# Patient Record
Sex: FEMALE | Race: BLACK OR AFRICAN AMERICAN
[De-identification: names, ages, dates, MRNs, and addresses within clinical notes are randomized per-mention and may not be internally consistent; named-entity substitution may affect disease eponyms.]

---

## 2017-10-06 ENCOUNTER — HOSPITAL ENCOUNTER (OUTPATIENT)
Dept: HOSPITAL 61 - KCIC MRI | Age: 77
Discharge: HOME | End: 2017-10-06
Attending: ORTHOPAEDIC SURGERY
Payer: COMMERCIAL

## 2017-10-06 DIAGNOSIS — M48.061: ICD-10-CM

## 2017-10-06 DIAGNOSIS — M51.16: Primary | ICD-10-CM

## 2017-10-06 DIAGNOSIS — M41.86: ICD-10-CM

## 2017-10-06 PROCEDURE — 72148 MRI LUMBAR SPINE W/O DYE: CPT

## 2017-10-06 NOTE — [facility]
EXAMINATION: Magnetic resonance imaging (MRI) of the lumbar spine without 

contrast

 

HISTORY: Left lumbar radiculopathy.

 

TECHNIQUE: Multiplanar multi-weighted MRI of the lumbar spine was 

performed without intravenous contrast using the standard lumbar spine 

protocol.

 

Contrast information:

None administered.

 

COMPARISON: None available.

 

FINDINGS:

 

There is levoconvex scoliosis of the lumbar spine with apex levocurvature 

at L2-L3. There is low T1 and T2 signal intensity involving the marrow 

with patchy distribution throughout the visualized spine and pelvis. There

is minimal retrolisthesis of L2 on L3. There are no compression fractures.

The conus medullaris terminates at the level of L1. The distal spinal cord

signal intensity is normal.  There is disc desiccation at all levels of 

the lumbar spine sparing L5-S1. Limited views of the abdomen and pelvis 

show no soft tissue abnormality.  The aorta is normal. 

 

T12-L1: There is minimal disc bulge. Mild facet arthropathy. There is no 

neural foraminal or spinal canal stenosis.

 

L1-L2: There is a diffuse disc bulge. There is mild facet arthropathy. 

There is moderate right neuroforaminal stenosis. There is mild spinal 

canal stenosis.

 

L2-L3: There is diffuse disc bulge. There is moderate facet arthropathy. 

There is severe right and moderate left neuroforaminal stenosis. There is 

mild spinal canal stenosis.

 

L3-L4: There is diffuse disc bulge . There is moderate facet arthropathy 

with ligamentum flavum infolding. There is moderate neuroforaminal 

stenosis. There is moderate spinal canal stenosis.

 

L4-L5: There is diffuse disc bulge with left central disc extrusion 

extending into the left foraminal zone. There is moderate to severe facet 

arthropathy with ligamentum flavum infolding. There is severe left and 

moderate right neuroforaminal stenosis. There is mild to moderate spinal 

canal stenosis.

 

L5-S1: Minimal disc bulge. There is moderate to severe facet arthropathy. 

There is mild left neuroforaminal stenosis. There is no spinal canal 

stenosis.

 

IMPRESSION:

 

1. Heterogeneous marrow signal intensity with patchy areas of low T1 and 

T2 signal suspicious for a marrow replacing process such as metastasis 

versus multiple myeloma. No pathologic fracture is identified.

 

2. Levoconvex scoliosis with moderate degenerative changes of the lumbar 

spine, most prominent at L4-L5 with disc bulge and left central disc 

extrusion with moderate to severe facet arthropathy and ligamentum flavum 

infolding resulting in severe left neuroforaminal stenosis and 

mild-to-moderate spinal canal stenosis.

 

3. Disc bulge with moderate facet arthropathy and ligamentum flavum 

infolding resulting in moderate neuroforaminal stenosis and moderate 

spinal canal stenosis at L3-L4.

 

Critical results were discussed with Naomy at Dr. Tellez's office on 

10/6/2017 at 3:30 PM by Dr. Jo.

 

Electronically signed by: Faith Jo MD (10/6/2017 3:36 PM) 

[facility]

## 2019-06-20 ENCOUNTER — HOSPITAL ENCOUNTER (OUTPATIENT)
Dept: HOSPITAL 61 - KCIC US | Age: 79
Discharge: HOME | End: 2019-06-20
Attending: FAMILY MEDICINE
Payer: MEDICARE

## 2019-06-20 DIAGNOSIS — R10.84: Primary | ICD-10-CM

## 2019-06-20 PROCEDURE — 76700 US EXAM ABDOM COMPLETE: CPT

## 2019-06-20 NOTE — [facility]
Limited abdominal ultrasound 6/20/2019

 

INDICATION: Generalized abdominal pain

 

COMPARISON STUDY: CT of the abdomen and pelvis October 19, 2011.

 

Discussion: Per technologist exam limited by patient body habitus. 

Ultrasound evaluation of the abdomen was performed. Static images are 

submitted to PACS. The pancreas is poorly visualized. The liver is 

diffusely hyperechoic. The liver is normal in size measuring 16 cm 

longitudinally. Portions of the liver are obscured. No intrahepatic 

biliary dilatation is seen. The common bile duct is normal diameter 

measuring 2-3 mm. The gallbladder demonstrates no evidence of wall 

thickening, stones, or sludge. No pericholecystic fluid is seen. The right

kidney is normal in appearance measuring 10.2 cm longitudinally. 

Visualized portions of the aorta and IVC are grossly unremarkable. Left 

kidney is normal in appearance measuring 10.4 cm longitudinally. The 

spleen is poorly visualized.

 

IMPRESSION:

1. No sonographic evidence of acute intra-abdominal abnormality is 

identified

2. Possible hepatic steatosis

3. Poorly visualized spleen and pancreas.

 

Electronically signed by: Alex Hernández MD (6/20/2019 12:52 PM) [id number]

## 2021-06-14 ENCOUNTER — HOSPITAL ENCOUNTER (OUTPATIENT)
Dept: HOSPITAL 61 - ONCLAB | Age: 81
End: 2021-06-14
Attending: INTERNAL MEDICINE
Payer: MEDICARE

## 2021-06-14 DIAGNOSIS — D50.0: Primary | ICD-10-CM

## 2021-06-14 PROCEDURE — 85045 AUTOMATED RETICULOCYTE COUNT: CPT

## 2021-06-14 PROCEDURE — 83520 IMMUNOASSAY QUANT NOS NONAB: CPT

## 2021-06-14 PROCEDURE — 36415 COLL VENOUS BLD VENIPUNCTURE: CPT

## 2021-06-14 PROCEDURE — 84165 PROTEIN E-PHORESIS SERUM: CPT

## 2021-06-14 PROCEDURE — 82525 ASSAY OF COPPER: CPT

## 2021-06-14 PROCEDURE — 82784 ASSAY IGA/IGD/IGG/IGM EACH: CPT

## 2021-06-14 PROCEDURE — 82746 ASSAY OF FOLIC ACID SERUM: CPT

## 2021-06-14 PROCEDURE — 82607 VITAMIN B-12: CPT

## 2021-06-14 PROCEDURE — 86334 IMMUNOFIX E-PHORESIS SERUM: CPT

## 2021-06-15 LAB
ALBUM: 3 G/DL (ref 2.9–4.4)
ALPHA1 GLOB SERPL ELPH-MCNC: 0.4 G/DL (ref 0–0.4)
ALPHA2 GLOB SERPL ELPH-MCNC: 0.9 G/DL (ref 0.4–1)
B-GLOBULIN SERPL ELPH-MCNC: 1.4 G/DL (ref 0.7–1.3)
GAMMA GLOB SERPL ELPH-MCNC: 1.8 G/DL (ref 0.4–1.8)
IGA SERPL-MCNC: 597 MG/DL (ref 64–422)
IGG SERPL-MCNC: 1735 MG/DL (ref 586–1602)
IGM SERPL-MCNC: 102 MG/DL (ref 26–217)
KAPPA LC SERPL-MCNC: 51.9 MG/L (ref 3.3–19.4)
KAPPA LC/LAMBDA SER: 1.11 {RATIO} (ref 0.26–1.65)
LAMBDA LC FREE SERPL-MCNC: 46.6 MG/L (ref 5.7–26.3)
PROT SERPL-MCNC: 7.6 G/DL (ref 6–8.5)
SPEP AG RATIO: 0.7 (ref 0.7–1.7)

## 2021-06-28 ENCOUNTER — HOSPITAL ENCOUNTER (OUTPATIENT)
Dept: HOSPITAL 61 - ONCLAB | Age: 81
End: 2021-06-28
Attending: PHYSICIAN ASSISTANT
Payer: MEDICARE

## 2021-06-28 DIAGNOSIS — D50.0: Primary | ICD-10-CM

## 2021-06-28 LAB
ANION GAP SERPL CALC-SCNC: 10 MMOL/L (ref 6–14)
BASOPHILS # BLD AUTO: 0 X10^3/UL (ref 0–0.2)
BASOPHILS NFR BLD: 0 % (ref 0–3)
BUN SERPL-MCNC: 7 MG/DL (ref 7–20)
CALCIUM SERPL-MCNC: 8.7 MG/DL (ref 8.5–10.1)
CHLORIDE SERPL-SCNC: 108 MMOL/L (ref 98–107)
CO2 SERPL-SCNC: 28 MMOL/L (ref 21–32)
CREAT SERPL-MCNC: 0.6 MG/DL (ref 0.6–1)
EOSINOPHIL NFR BLD: 0 X10^3/UL (ref 0–0.7)
EOSINOPHIL NFR BLD: 1 % (ref 0–3)
ERYTHROCYTE [DISTWIDTH] IN BLOOD BY AUTOMATED COUNT: 19 % (ref 11.5–14.5)
GFR SERPLBLD BASED ON 1.73 SQ M-ARVRAT: 117 ML/MIN
GLUCOSE SERPL-MCNC: 95 MG/DL (ref 70–99)
HCT VFR BLD CALC: 26.3 % (ref 36–47)
HGB BLD-MCNC: 7.9 G/DL (ref 12–15.5)
LYMPHOCYTES # BLD: 0.8 X10^3/UL (ref 1–4.8)
LYMPHOCYTES NFR BLD AUTO: 15 % (ref 24–48)
MCH RBC QN AUTO: 25 PG (ref 25–35)
MCHC RBC AUTO-ENTMCNC: 30 G/DL (ref 31–37)
MCV RBC AUTO: 82 FL (ref 79–100)
MONO #: 1 X10^3/UL (ref 0–1.1)
MONOCYTES NFR BLD: 20 % (ref 0–9)
NEUT #: 3.4 X10^3/UL (ref 1.8–7.7)
NEUTROPHILS NFR BLD AUTO: 64 % (ref 31–73)
PLATELET # BLD AUTO: 526 X10^3/UL (ref 140–400)
POTASSIUM SERPL-SCNC: 3.2 MMOL/L (ref 3.5–5.1)
RBC # BLD AUTO: 3.21 X10^6/UL (ref 3.5–5.4)
SODIUM SERPL-SCNC: 146 MMOL/L (ref 136–145)
WBC # BLD AUTO: 5.3 X10^3/UL (ref 4–11)

## 2021-06-28 PROCEDURE — 85025 COMPLETE CBC W/AUTO DIFF WBC: CPT

## 2021-06-28 PROCEDURE — 36415 COLL VENOUS BLD VENIPUNCTURE: CPT

## 2021-06-28 PROCEDURE — 80048 BASIC METABOLIC PNL TOTAL CA: CPT

## 2021-07-01 ENCOUNTER — HOSPITAL ENCOUNTER (OUTPATIENT)
Dept: HOSPITAL 61 - ECHO | Age: 81
End: 2021-07-01
Attending: INTERNAL MEDICINE
Payer: MEDICARE

## 2021-07-01 DIAGNOSIS — I51.7: Primary | ICD-10-CM

## 2021-07-01 PROCEDURE — 93306 TTE W/DOPPLER COMPLETE: CPT

## 2021-07-01 NOTE — CARD
MR#: [id number]

Account#: [id number]

Accession#: [id number]

Date of Study: 07/01/2021

Ordering Physician: RIGO SORTO, 

Referring Physician: RIGO SORTO, 

Tech: Lorena Vazquez, [facility]





--------------- APPROVED REPORT --------------





EXAM: Two-dimensional and M-mode echocardiogram with Doppler and color Doppler.



Other Information 

Quality : AverageHR: 91bpm



INDICATION

Dyspnea 



RISK FACTORS

Hypertension 

Hyperlipidemia



2D DIMENSIONS 

RVDd3.2 (2.9-3.5cm)Left Atrium(2D)3.1 (1.6-4.0cm)

IVSd1.2 (0.7-1.1cm)Aortic Root(2D)2.9 (2.0-3.7cm)

LVDd4.7 (3.9-5.9cm)LVOT Diameter2.0 (1.8-2.4cm)

PWd1.3 (0.7-1.1cm)LVDs2.8 (2.5-4.0cm)

FS (%) 39.9 %SV70.5 ml

LVEF(%)60.6 (>50%)



Aortic Valve

AoV Peak Presley.146.7cm/sAoV VTI22.3cm

AO Peak GR.8.6mmHgLVOT Peak Presley.99.7cm/s

LVOT  VTI 16.35cmAO Mean GR.4mmHg

SUSAN (VMAX)1.60hi5XMF   (VTI)2.35cm2



Mitral Valve

MV E Vjkmejzx81.5cm/sMV DECEL [id number]

MV A Dclypsza65.4cm/sMV AUN02tm

E/A  Ratio0.8MVA (PHT)2.80cm2



TDI

E/Lateral E'6.8E/Medial E'7.8



Pulmonary Valve

PV Peak Sahxujco15.2cm/sPV Peak Grad.3mmHg



Tricuspid Valve

TR P. Zjmmharc284fz/sRAP ZYGHYDXA9xwBi

TR Peak Gr.96jbStNLOQ68djMm



Pulmonary Vein

S1 Bhvngkms61.1cm/sD2 Pwmgxdzo32.5cm/s

PVa [id number]



 LEFT VENTRICLE 

The left ventricle is normal size. There is mild to moderate concentric left ventricular hypertrophy.
 The left ventricular systolic function is normal. The Ejection Fraction is 50-55%. There is normal L
V segmental wall motion. Transmitral Doppler flow pattern is Grade I-abnormal relaxation pattern.



 RIGHT VENTRICLE 

The right ventricle is normal size. There is normal right ventricular wall thickness. The right ventr
icular systolic function is normal.



 ATRIA 

The left atrium size is normal. The right atrium size is normal. The interatrial septum is intact wit
h no evidence for an atrial septal defect or patent foramen ovale as noted on 2-D or Doppler imaging.




 AORTIC VALVE 

The aortic valve is normal in structure and function. Doppler and Color Flow revealed trace aortic re
gurgitation. There is no significant aortic valvular stenosis. Calculated aortic valve area is 1.95 c
m2 with maximum pressure gradient of 10 mmHg and mean pressure gradient of 5 mmHg.



 MITRAL VALVE 

The mitral valve is normal in structure and function. There is no evidence of mitral valve prolapse. 
There is no mitral valve stenosis. Doppler and Color-flow revealed trace mitral regurgitation.



 TRICUSPID VALVE 

The tricuspid valve is normal in structure and function. Doppler and Color Flow revealed trace tricus
pid regurgitation with an estimated PAP of 31 mmHg. There is no tricuspid valve stenosis.



 PULMONIC VALVE 

The pulmonary valve is normal in structure and function. Doppler and Color Flow revealed trace pulmon
ic valvular regurgitation.



 GREAT VESSELS 

The aortic root is normal in size. The ascending aorta is normal in size. The IVC is normal in size a
nd collapses >50% with inspiration.



 PERICARDIAL EFFUSION 

There is no evidence of significant pericardial effusion.



Critical Notification

Critical Value: No



<Conclusion>

The left ventricular systolic function is normal.

The Ejection Fraction is 50-55%.

There is normal LV segmental wall motion.

Transmitral Doppler flow pattern is Grade I-abnormal relaxation pattern.

Trace mitral regurgitation.

Trace tricuspid regurgitation with an estimated PAP of 31 mmHg.

There is no evidence of significant pericardial effusion.



Signed by : Juan David Sutton, 

Electronically Approved : 07/01/2021 12:51:09

## 2021-07-12 ENCOUNTER — HOSPITAL ENCOUNTER (OUTPATIENT)
Dept: HOSPITAL 61 - ONCLAB | Age: 81
End: 2021-07-12
Attending: INTERNAL MEDICINE
Payer: MEDICARE

## 2021-07-12 DIAGNOSIS — D50.0: Primary | ICD-10-CM

## 2021-07-12 LAB
ANISOCYTOSIS BLD QL SMEAR: (no result)
BASOPHILS # BLD AUTO: 0 X10^3/UL (ref 0–0.2)
BASOPHILS NFR BLD: 1 % (ref 0–3)
DACRYOCYTES BLD QL SMEAR: (no result)
EOSINOPHIL NFR BLD: 0 X10^3/UL (ref 0–0.7)
EOSINOPHIL NFR BLD: 1 % (ref 0–3)
ERYTHROCYTE [DISTWIDTH] IN BLOOD BY AUTOMATED COUNT: 25.7 % (ref 11.5–14.5)
HCT VFR BLD CALC: 29.3 % (ref 36–47)
HGB BLD-MCNC: 9 G/DL (ref 12–15.5)
LYMPHOCYTES # BLD: 1.6 X10^3/UL (ref 1–4.8)
LYMPHOCYTES NFR BLD AUTO: 33 % (ref 24–48)
MCH RBC QN AUTO: 26 PG (ref 25–35)
MCHC RBC AUTO-ENTMCNC: 31 G/DL (ref 31–37)
MCV RBC AUTO: 84 FL (ref 79–100)
MONO #: 0.7 X10^3/UL (ref 0–1.1)
MONOCYTES NFR BLD: 14 % (ref 0–9)
NEUT #: 2.5 X10^3/UL (ref 1.8–7.7)
NEUTROPHILS NFR BLD AUTO: 51 % (ref 31–73)
OVALOCYTES BLD QL SMEAR: (no result)
PLATELET # BLD AUTO: 458 X10^3/UL (ref 140–400)
PLATELET # BLD EST: (no result) 10*3/UL
POLYCHROMASIA BLD QL SMEAR: PRESENT
RBC # BLD AUTO: 3.5 X10^6/UL (ref 3.5–5.4)
TARGETS BLD QL SMEAR: PRESENT
WBC # BLD AUTO: 4.9 X10^3/UL (ref 4–11)

## 2021-07-12 PROCEDURE — 83540 ASSAY OF IRON: CPT

## 2021-07-12 PROCEDURE — 85025 COMPLETE CBC W/AUTO DIFF WBC: CPT

## 2021-07-12 PROCEDURE — 36415 COLL VENOUS BLD VENIPUNCTURE: CPT

## 2021-07-12 PROCEDURE — 83550 IRON BINDING TEST: CPT

## 2021-07-12 PROCEDURE — 82728 ASSAY OF FERRITIN: CPT

## 2021-07-15 ENCOUNTER — HOSPITAL ENCOUNTER (OUTPATIENT)
Dept: HOSPITAL 61 - ONCLAB | Age: 81
End: 2021-07-15
Attending: PHYSICIAN ASSISTANT
Payer: MEDICARE

## 2021-07-15 DIAGNOSIS — D50.0: Primary | ICD-10-CM

## 2021-07-15 LAB
FECAL OB PT: NEGATIVE
FECAL OB PT: NEGATIVE

## 2021-07-15 PROCEDURE — 82274 ASSAY TEST FOR BLOOD FECAL: CPT

## 2021-08-16 ENCOUNTER — HOSPITAL ENCOUNTER (OUTPATIENT)
Dept: HOSPITAL 61 - ONCLAB | Age: 81
End: 2021-08-16
Attending: PHYSICIAN ASSISTANT
Payer: MEDICARE

## 2021-08-16 DIAGNOSIS — D50.0: Primary | ICD-10-CM

## 2021-08-16 DIAGNOSIS — E03.9: ICD-10-CM

## 2021-08-16 LAB
% ATYL: 3 % (ref 0–0)
% BANDS: 1 % (ref 0–9)
% LYMPHS: 19 % (ref 24–48)
% MONOS: 12 % (ref 0–10)
% SEGS: 65 % (ref 35–66)
ANISOCYTOSIS BLD QL SMEAR: PRESENT
BASOPHILS # BLD AUTO: 0 X10^3/UL (ref 0–0.2)
BASOPHILS NFR BLD: 1 % (ref 0–3)
EOSINOPHIL NFR BLD: 0 X10^3/UL (ref 0–0.7)
EOSINOPHIL NFR BLD: 1 % (ref 0–3)
ERYTHROCYTE [DISTWIDTH] IN BLOOD BY AUTOMATED COUNT: 22.3 % (ref 11.5–14.5)
HCT VFR BLD CALC: 27.8 % (ref 36–47)
HGB BLD-MCNC: 8.6 G/DL (ref 12–15.5)
HYPOCHROMIA BLD QL SMEAR: PRESENT
LYMPHOCYTES # BLD: 1.8 X10^3/UL (ref 1–4.8)
LYMPHOCYTES NFR BLD AUTO: 37 % (ref 24–48)
MCH RBC QN AUTO: 25 PG (ref 25–35)
MCHC RBC AUTO-ENTMCNC: 31 G/DL (ref 31–37)
MCV RBC AUTO: 81 FL (ref 79–100)
MICROCYTES BLD QL SMEAR: PRESENT
MONO #: 0.7 X10^3/UL (ref 0–1.1)
MONOCYTES NFR BLD: 15 % (ref 0–9)
NEUT #: 2.3 X10^3/UL (ref 1.8–7.7)
NEUTROPHILS NFR BLD AUTO: 47 % (ref 31–73)
PLATELET # BLD AUTO: 501 X10^3/UL (ref 140–400)
PLATELET # BLD EST: (no result) 10*3/UL
POLYCHROMASIA BLD QL SMEAR: PRESENT
RBC # BLD AUTO: 3.43 X10^6/UL (ref 3.5–5.4)
TARGETS BLD QL SMEAR: PRESENT
WBC # BLD AUTO: 4.9 X10^3/UL (ref 4–11)

## 2021-08-16 PROCEDURE — 83550 IRON BINDING TEST: CPT

## 2021-08-16 PROCEDURE — 36415 COLL VENOUS BLD VENIPUNCTURE: CPT

## 2021-08-16 PROCEDURE — 82728 ASSAY OF FERRITIN: CPT

## 2021-08-16 PROCEDURE — 85007 BL SMEAR W/DIFF WBC COUNT: CPT

## 2021-08-16 PROCEDURE — 83540 ASSAY OF IRON: CPT

## 2021-08-16 PROCEDURE — 85025 COMPLETE CBC W/AUTO DIFF WBC: CPT

## 2021-08-16 PROCEDURE — 84443 ASSAY THYROID STIM HORMONE: CPT

## 2021-08-26 ENCOUNTER — HOSPITAL ENCOUNTER (OUTPATIENT)
Dept: HOSPITAL 61 - ONCLAB | Age: 81
End: 2021-08-26
Attending: PHYSICIAN ASSISTANT
Payer: MEDICARE

## 2021-08-26 DIAGNOSIS — D50.0: Primary | ICD-10-CM

## 2021-08-26 LAB
% ATYL: 1 % (ref 0–0)
% BANDS: 2 % (ref 0–9)
% LYMPHS: 21 % (ref 24–48)
% MONOS: 14 % (ref 0–10)
% SEGS: 58 % (ref 35–66)
ANISOCYTOSIS BLD QL SMEAR: (no result)
BASOPHILS # BLD AUTO: 0.1 X10^3/UL (ref 0–0.2)
BASOPHILS NFR BLD AUTO: 3 % (ref 0–3)
BASOPHILS NFR BLD: 1 % (ref 0–3)
EOSINOPHIL NFR BLD AUTO: 1 % (ref 0–5)
EOSINOPHIL NFR BLD: 0.1 X10^3/UL (ref 0–0.7)
EOSINOPHIL NFR BLD: 1 % (ref 0–3)
ERYTHROCYTE [DISTWIDTH] IN BLOOD BY AUTOMATED COUNT: 21.8 % (ref 11.5–14.5)
HCT VFR BLD CALC: 29.3 % (ref 36–47)
HGB BLD-MCNC: 8.9 G/DL (ref 12–15.5)
LYMPHOCYTES # BLD: 1.5 X10^3/UL (ref 1–4.8)
LYMPHOCYTES NFR BLD AUTO: 30 % (ref 24–48)
MCH RBC QN AUTO: 25 PG (ref 25–35)
MCHC RBC AUTO-ENTMCNC: 30 G/DL (ref 31–37)
MCV RBC AUTO: 82 FL (ref 79–100)
MONO #: 0.8 X10^3/UL (ref 0–1.1)
MONOCYTES NFR BLD: 17 % (ref 0–9)
NEUT #: 2.4 X10^3/UL (ref 1.8–7.7)
NEUTROPHILS NFR BLD AUTO: 51 % (ref 31–73)
PLATELET # BLD AUTO: 531 X10^3/UL (ref 140–400)
PLATELET # BLD EST: (no result) 10*3/UL
POLYCHROMASIA BLD QL SMEAR: SLIGHT
RBC # BLD AUTO: 3.6 X10^6/UL (ref 3.5–5.4)
TARGETS BLD QL SMEAR: (no result)
WBC # BLD AUTO: 4.8 X10^3/UL (ref 4–11)

## 2021-08-26 PROCEDURE — 82728 ASSAY OF FERRITIN: CPT

## 2021-08-26 PROCEDURE — 85007 BL SMEAR W/DIFF WBC COUNT: CPT

## 2021-08-26 PROCEDURE — 83540 ASSAY OF IRON: CPT

## 2021-08-26 PROCEDURE — 83550 IRON BINDING TEST: CPT

## 2021-08-26 PROCEDURE — 85025 COMPLETE CBC W/AUTO DIFF WBC: CPT

## 2021-08-26 PROCEDURE — 36415 COLL VENOUS BLD VENIPUNCTURE: CPT

## 2021-09-13 ENCOUNTER — HOSPITAL ENCOUNTER (OUTPATIENT)
Dept: HOSPITAL 61 - ONCLAB | Age: 81
End: 2021-09-13
Attending: PHYSICIAN ASSISTANT
Payer: MEDICARE

## 2021-09-13 DIAGNOSIS — D50.0: Primary | ICD-10-CM

## 2021-09-13 LAB
ANISOCYTOSIS BLD QL SMEAR: (no result)
BASOPHILS # BLD AUTO: 0 X10^3/UL (ref 0–0.2)
BASOPHILS NFR BLD: 1 % (ref 0–3)
EOSINOPHIL NFR BLD: 0 X10^3/UL (ref 0–0.7)
EOSINOPHIL NFR BLD: 1 % (ref 0–3)
ERYTHROCYTE [DISTWIDTH] IN BLOOD BY AUTOMATED COUNT: 24.9 % (ref 11.5–14.5)
HCT VFR BLD CALC: 31 % (ref 36–47)
HGB BLD-MCNC: 9.9 G/DL (ref 12–15.5)
LYMPHOCYTES # BLD: 1.2 X10^3/UL (ref 1–4.8)
LYMPHOCYTES NFR BLD AUTO: 25 % (ref 24–48)
MCH RBC QN AUTO: 27 PG (ref 25–35)
MCHC RBC AUTO-ENTMCNC: 32 G/DL (ref 31–37)
MCV RBC AUTO: 85 FL (ref 79–100)
MONO #: 0.8 X10^3/UL (ref 0–1.1)
MONOCYTES NFR BLD: 17 % (ref 0–9)
NEUT #: 2.6 X10^3/UL (ref 1.8–7.7)
NEUTROPHILS NFR BLD AUTO: 56 % (ref 31–73)
PLATELET # BLD AUTO: 419 X10^3/UL (ref 140–400)
PLATELET # BLD EST: (no result) 10*3/UL
RBC # BLD AUTO: 3.63 X10^6/UL (ref 3.5–5.4)
TARGETS BLD QL SMEAR: PRESENT
WBC # BLD AUTO: 4.7 X10^3/UL (ref 4–11)

## 2021-09-13 PROCEDURE — 36415 COLL VENOUS BLD VENIPUNCTURE: CPT

## 2021-09-13 PROCEDURE — 83540 ASSAY OF IRON: CPT

## 2021-09-13 PROCEDURE — 83550 IRON BINDING TEST: CPT

## 2021-09-13 PROCEDURE — 82728 ASSAY OF FERRITIN: CPT

## 2021-09-13 PROCEDURE — 85025 COMPLETE CBC W/AUTO DIFF WBC: CPT

## 2021-10-01 ENCOUNTER — HOSPITAL ENCOUNTER (OUTPATIENT)
Dept: HOSPITAL 61 - ONCLAB | Age: 81
End: 2021-10-01
Attending: INTERNAL MEDICINE
Payer: MEDICARE

## 2021-10-01 DIAGNOSIS — D50.0: Primary | ICD-10-CM

## 2021-10-01 LAB
BASOPHILS # BLD AUTO: 0.1 X10^3/UL (ref 0–0.2)
BASOPHILS NFR BLD: 1 % (ref 0–3)
EOSINOPHIL NFR BLD: 0.1 X10^3/UL (ref 0–0.7)
EOSINOPHIL NFR BLD: 1 % (ref 0–3)
ERYTHROCYTE [DISTWIDTH] IN BLOOD BY AUTOMATED COUNT: 23.4 % (ref 11.5–14.5)
HCT VFR BLD CALC: 33.1 % (ref 36–47)
HGB BLD-MCNC: 10.4 G/DL (ref 12–15.5)
LYMPHOCYTES # BLD: 1.4 X10^3/UL (ref 1–4.8)
LYMPHOCYTES NFR BLD AUTO: 29 % (ref 24–48)
MCH RBC QN AUTO: 28 PG (ref 25–35)
MCHC RBC AUTO-ENTMCNC: 31 G/DL (ref 31–37)
MCV RBC AUTO: 88 FL (ref 79–100)
MONO #: 0.8 X10^3/UL (ref 0–1.1)
MONOCYTES NFR BLD: 16 % (ref 0–9)
NEUT #: 2.6 X10^3/UL (ref 1.8–7.7)
NEUTROPHILS NFR BLD AUTO: 53 % (ref 31–73)
PLATELET # BLD AUTO: 373 X10^3/UL (ref 140–400)
RBC # BLD AUTO: 3.75 X10^6/UL (ref 3.5–5.4)
WBC # BLD AUTO: 4.8 X10^3/UL (ref 4–11)

## 2021-10-01 PROCEDURE — 82728 ASSAY OF FERRITIN: CPT

## 2021-10-01 PROCEDURE — 83550 IRON BINDING TEST: CPT

## 2021-10-01 PROCEDURE — 36415 COLL VENOUS BLD VENIPUNCTURE: CPT

## 2021-10-01 PROCEDURE — 85025 COMPLETE CBC W/AUTO DIFF WBC: CPT

## 2021-10-01 PROCEDURE — 83540 ASSAY OF IRON: CPT

## 2022-05-06 ENCOUNTER — HOSPITAL ENCOUNTER (OUTPATIENT)
Dept: HOSPITAL 61 - ONCLAB | Age: 82
End: 2022-05-06
Attending: INTERNAL MEDICINE
Payer: MEDICARE

## 2022-05-06 DIAGNOSIS — D50.0: Primary | ICD-10-CM

## 2022-05-06 LAB
ANISOCYTOSIS BLD QL SMEAR: SLIGHT
BASOPHILS # BLD AUTO: 0 X10^3/UL (ref 0–0.2)
BASOPHILS NFR BLD: 1 % (ref 0–3)
EOSINOPHIL NFR BLD: 0.1 X10^3/UL (ref 0–0.7)
EOSINOPHIL NFR BLD: 2 % (ref 0–3)
ERYTHROCYTE [DISTWIDTH] IN BLOOD BY AUTOMATED COUNT: 18.4 % (ref 11.5–14.5)
HCT VFR BLD CALC: 26.5 % (ref 36–47)
HGB BLD-MCNC: 7.9 G/DL (ref 12–15.5)
LYMPHOCYTES # BLD: 2 X10^3/UL (ref 1–4.8)
LYMPHOCYTES NFR BLD AUTO: 49 % (ref 24–48)
MCH RBC QN AUTO: 23 PG (ref 25–35)
MCHC RBC AUTO-ENTMCNC: 29 G/DL (ref 31–37)
MCV RBC AUTO: 78 FL (ref 79–100)
MONO #: 0.4 X10^3/UL (ref 0–1.1)
MONOCYTES NFR BLD: 11 % (ref 0–9)
NEUT #: 1.5 X10^3/UL (ref 1.8–7.7)
NEUTROPHILS NFR BLD AUTO: 37 % (ref 31–73)
PLATELET # BLD AUTO: 442 X10^3/UL (ref 140–400)
PLATELET # BLD EST: (no result) 10*3/UL
RBC # BLD AUTO: 3.43 X10^6/UL (ref 3.5–5.4)
WBC # BLD AUTO: 4.1 X10^3/UL (ref 4–11)

## 2022-05-06 PROCEDURE — 36415 COLL VENOUS BLD VENIPUNCTURE: CPT

## 2022-05-06 PROCEDURE — 83540 ASSAY OF IRON: CPT

## 2022-05-06 PROCEDURE — 82728 ASSAY OF FERRITIN: CPT

## 2022-05-06 PROCEDURE — 83550 IRON BINDING TEST: CPT

## 2022-05-06 PROCEDURE — 85025 COMPLETE CBC W/AUTO DIFF WBC: CPT

## 2022-05-12 ENCOUNTER — HOSPITAL ENCOUNTER (OUTPATIENT)
Dept: HOSPITAL 61 - ONCLAB | Age: 82
End: 2022-05-12
Attending: INTERNAL MEDICINE
Payer: MEDICARE

## 2022-05-12 DIAGNOSIS — D50.0: Primary | ICD-10-CM

## 2022-05-12 LAB
ALBUMIN SERPL-MCNC: 3.3 G/DL (ref 3.4–5)
ALBUMIN/GLOB SERPL: 0.6 {RATIO} (ref 1–1.7)
ALP SERPL-CCNC: 98 U/L (ref 46–116)
ALT SERPL-CCNC: 23 U/L (ref 14–59)
ANION GAP SERPL CALC-SCNC: 10 MMOL/L (ref 6–14)
AST SERPL-CCNC: 25 U/L (ref 15–37)
BASOPHILS # BLD AUTO: 0 X10^3/UL (ref 0–0.2)
BASOPHILS NFR BLD: 1 % (ref 0–3)
BILIRUB SERPL-MCNC: 0.2 MG/DL (ref 0.2–1)
BUN SERPL-MCNC: 15 MG/DL (ref 7–20)
BUN/CREAT SERPL: 19 (ref 6–20)
CALCIUM SERPL-MCNC: 9.1 MG/DL (ref 8.5–10.1)
CHLORIDE SERPL-SCNC: 108 MMOL/L (ref 98–107)
CO2 SERPL-SCNC: 24 MMOL/L (ref 21–32)
CREAT SERPL-MCNC: 0.8 MG/DL (ref 0.6–1)
EOSINOPHIL NFR BLD: 0.1 X10^3/UL (ref 0–0.7)
EOSINOPHIL NFR BLD: 2 % (ref 0–3)
ERYTHROCYTE [DISTWIDTH] IN BLOOD BY AUTOMATED COUNT: 18.6 % (ref 11.5–14.5)
GFR SERPLBLD BASED ON 1.73 SQ M-ARVRAT: 83.7 ML/MIN
GLUCOSE SERPL-MCNC: 90 MG/DL (ref 70–99)
HCT VFR BLD CALC: 25.9 % (ref 36–47)
HGB BLD-MCNC: 7.6 G/DL (ref 12–15.5)
LYMPHOCYTES # BLD: 1.7 X10^3/UL (ref 1–4.8)
LYMPHOCYTES NFR BLD AUTO: 41 % (ref 24–48)
MCH RBC QN AUTO: 23 PG (ref 25–35)
MCHC RBC AUTO-ENTMCNC: 30 G/DL (ref 31–37)
MCV RBC AUTO: 78 FL (ref 79–100)
MONO #: 0.6 X10^3/UL (ref 0–1.1)
MONOCYTES NFR BLD: 16 % (ref 0–9)
NEUT #: 1.6 X10^3/UL (ref 1.8–7.7)
NEUTROPHILS NFR BLD AUTO: 40 % (ref 31–73)
PLATELET # BLD AUTO: 414 X10^3/UL (ref 140–400)
POTASSIUM SERPL-SCNC: 4.6 MMOL/L (ref 3.5–5.1)
PROT SERPL-MCNC: 8.4 G/DL (ref 6.4–8.2)
RBC # BLD AUTO: 3.32 X10^6/UL (ref 3.5–5.4)
SODIUM SERPL-SCNC: 142 MMOL/L (ref 136–145)
WBC # BLD AUTO: 4 X10^3/UL (ref 4–11)

## 2022-05-12 PROCEDURE — 83550 IRON BINDING TEST: CPT

## 2022-05-12 PROCEDURE — 36415 COLL VENOUS BLD VENIPUNCTURE: CPT

## 2022-05-12 PROCEDURE — 82728 ASSAY OF FERRITIN: CPT

## 2022-05-12 PROCEDURE — 85025 COMPLETE CBC W/AUTO DIFF WBC: CPT

## 2022-05-12 PROCEDURE — 83540 ASSAY OF IRON: CPT

## 2022-05-12 PROCEDURE — 80053 COMPREHEN METABOLIC PANEL: CPT

## 2022-05-27 ENCOUNTER — HOSPITAL ENCOUNTER (OUTPATIENT)
Dept: HOSPITAL 61 - ONCLAB | Age: 82
End: 2022-05-27
Attending: INTERNAL MEDICINE
Payer: MEDICARE

## 2022-05-27 DIAGNOSIS — D50.0: Primary | ICD-10-CM

## 2022-05-27 LAB
ALBUMIN SERPL-MCNC: 3.6 G/DL (ref 3.4–5)
ALBUMIN/GLOB SERPL: 0.8 {RATIO} (ref 1–1.7)
ALP SERPL-CCNC: 91 U/L (ref 46–116)
ALT SERPL-CCNC: 25 U/L (ref 14–59)
ANION GAP SERPL CALC-SCNC: 9 MMOL/L (ref 6–14)
ANISOCYTOSIS BLD QL SMEAR: (no result)
AST SERPL-CCNC: 26 U/L (ref 15–37)
BASOPHILS # BLD AUTO: 0 X10^3/UL (ref 0–0.2)
BASOPHILS NFR BLD: 1 % (ref 0–3)
BILIRUB SERPL-MCNC: 0.4 MG/DL (ref 0.2–1)
BUN SERPL-MCNC: 12 MG/DL (ref 7–20)
BUN/CREAT SERPL: 13 (ref 6–20)
CALCIUM SERPL-MCNC: 9.4 MG/DL (ref 8.5–10.1)
CHLORIDE SERPL-SCNC: 109 MMOL/L (ref 98–107)
CO2 SERPL-SCNC: 26 MMOL/L (ref 21–32)
CREAT SERPL-MCNC: 0.9 MG/DL (ref 0.6–1)
EOSINOPHIL NFR BLD: 0.2 X10^3/UL (ref 0–0.7)
EOSINOPHIL NFR BLD: 4 % (ref 0–3)
ERYTHROCYTE [DISTWIDTH] IN BLOOD BY AUTOMATED COUNT: 32.5 % (ref 11.5–14.5)
GFR SERPLBLD BASED ON 1.73 SQ M-ARVRAT: 73.1 ML/MIN
GLUCOSE SERPL-MCNC: 96 MG/DL (ref 70–99)
HCT VFR BLD CALC: 28.7 % (ref 36–47)
HGB BLD-MCNC: 8.8 G/DL (ref 12–15.5)
LYMPHOCYTES # BLD: 1.3 X10^3/UL (ref 1–4.8)
LYMPHOCYTES NFR BLD AUTO: 31 % (ref 24–48)
MCH RBC QN AUTO: 27 PG (ref 25–35)
MCHC RBC AUTO-ENTMCNC: 31 G/DL (ref 31–37)
MCV RBC AUTO: 90 FL (ref 79–100)
MONO #: 0.6 X10^3/UL (ref 0–1.1)
MONOCYTES NFR BLD: 14 % (ref 0–9)
NEUT #: 2.1 X10^3/UL (ref 1.8–7.7)
NEUTROPHILS NFR BLD AUTO: 51 % (ref 31–73)
PLATELET # BLD AUTO: 316 X10^3/UL (ref 140–400)
PLATELET # BLD EST: ADEQUATE 10*3/UL
POTASSIUM SERPL-SCNC: 4 MMOL/L (ref 3.5–5.1)
PROT SERPL-MCNC: 8.2 G/DL (ref 6.4–8.2)
RBC # BLD AUTO: 3.21 X10^6/UL (ref 3.5–5.4)
SODIUM SERPL-SCNC: 144 MMOL/L (ref 136–145)
WBC # BLD AUTO: 4.1 X10^3/UL (ref 4–11)

## 2022-05-27 PROCEDURE — 82728 ASSAY OF FERRITIN: CPT

## 2022-05-27 PROCEDURE — 85025 COMPLETE CBC W/AUTO DIFF WBC: CPT

## 2022-05-27 PROCEDURE — 36415 COLL VENOUS BLD VENIPUNCTURE: CPT

## 2022-05-27 PROCEDURE — 83550 IRON BINDING TEST: CPT

## 2022-05-27 PROCEDURE — 80053 COMPREHEN METABOLIC PANEL: CPT

## 2022-05-27 PROCEDURE — 83540 ASSAY OF IRON: CPT
